# Patient Record
Sex: FEMALE | Race: ASIAN | NOT HISPANIC OR LATINO | URBAN - METROPOLITAN AREA
[De-identification: names, ages, dates, MRNs, and addresses within clinical notes are randomized per-mention and may not be internally consistent; named-entity substitution may affect disease eponyms.]

---

## 2017-06-11 ENCOUNTER — EMERGENCY (EMERGENCY)
Facility: HOSPITAL | Age: 22
LOS: 1 days | Discharge: HOME | End: 2017-06-11
Attending: EMERGENCY MEDICINE | Admitting: EMERGENCY MEDICINE
Payer: MEDICAID

## 2017-06-11 VITALS
HEART RATE: 78 BPM | DIASTOLIC BLOOD PRESSURE: 78 MMHG | RESPIRATION RATE: 24 BRPM | OXYGEN SATURATION: 100 % | TEMPERATURE: 98 F | SYSTOLIC BLOOD PRESSURE: 121 MMHG

## 2017-06-11 PROCEDURE — 99283 EMERGENCY DEPT VISIT LOW MDM: CPT | Mod: 25

## 2017-06-11 PROCEDURE — 93010 ELECTROCARDIOGRAM REPORT: CPT

## 2017-06-11 NOTE — ED ADULT TRIAGE NOTE - CHIEF COMPLAINT QUOTE
Pt. c/o dizziness while lying down x 3-4 days; states she is unable to fall asleep because she becomes "very dizzy" like the "room is spinning". Admits to an episode of nausea, vomiting and abdominal pain associated with dizziness 2 days ago, also c/o feeling "SOB" when dizziness starts. Denies chest pain, palpitations, fever, chills, weakness. Pt. c/o dizziness while lying down x 3-4 days; states she is unable to fall asleep because she becomes "very dizzy" like the "room is spinning". Admits to an episode of nausea, vomiting and abdominal pain associated with dizziness 2 days ago, also c/o "feeling SOB". Denies chest pain, palpitations, fever, chills, weakness.  Respirations even, unlabored, but mildly increased.

## 2017-06-12 VITALS
OXYGEN SATURATION: 100 % | SYSTOLIC BLOOD PRESSURE: 120 MMHG | DIASTOLIC BLOOD PRESSURE: 94 MMHG | RESPIRATION RATE: 16 BRPM | HEART RATE: 79 BPM

## 2017-06-12 LAB
ALBUMIN SERPL ELPH-MCNC: 4.5 G/DL — SIGNIFICANT CHANGE UP (ref 3.3–5)
ALP SERPL-CCNC: 59 U/L — SIGNIFICANT CHANGE UP (ref 40–120)
ALT FLD-CCNC: 8 U/L — SIGNIFICANT CHANGE UP (ref 4–33)
AMORPH CRY # UR COMP ASSIST: SIGNIFICANT CHANGE UP (ref 0–0)
APPEARANCE UR: SIGNIFICANT CHANGE UP
AST SERPL-CCNC: 15 U/L — SIGNIFICANT CHANGE UP (ref 4–32)
BASOPHILS # BLD AUTO: 0.01 K/UL — SIGNIFICANT CHANGE UP (ref 0–0.2)
BASOPHILS NFR BLD AUTO: 0.1 % — SIGNIFICANT CHANGE UP (ref 0–2)
BILIRUB SERPL-MCNC: 0.5 MG/DL — SIGNIFICANT CHANGE UP (ref 0.2–1.2)
BILIRUB UR-MCNC: NEGATIVE — SIGNIFICANT CHANGE UP
BLOOD UR QL VISUAL: NEGATIVE — SIGNIFICANT CHANGE UP
BUN SERPL-MCNC: 13 MG/DL — SIGNIFICANT CHANGE UP (ref 7–23)
CALCIUM SERPL-MCNC: 9.5 MG/DL — SIGNIFICANT CHANGE UP (ref 8.4–10.5)
CHLORIDE SERPL-SCNC: 102 MMOL/L — SIGNIFICANT CHANGE UP (ref 98–107)
CO2 SERPL-SCNC: 24 MMOL/L — SIGNIFICANT CHANGE UP (ref 22–31)
COLOR SPEC: YELLOW — SIGNIFICANT CHANGE UP
CREAT SERPL-MCNC: 0.53 MG/DL — SIGNIFICANT CHANGE UP (ref 0.5–1.3)
EOSINOPHIL # BLD AUTO: 0.06 K/UL — SIGNIFICANT CHANGE UP (ref 0–0.5)
EOSINOPHIL NFR BLD AUTO: 0.7 % — SIGNIFICANT CHANGE UP (ref 0–6)
GLUCOSE SERPL-MCNC: 104 MG/DL — HIGH (ref 70–99)
GLUCOSE UR-MCNC: NEGATIVE — SIGNIFICANT CHANGE UP
HCT VFR BLD CALC: 39.4 % — SIGNIFICANT CHANGE UP (ref 34.5–45)
HGB BLD-MCNC: 12.9 G/DL — SIGNIFICANT CHANGE UP (ref 11.5–15.5)
IMM GRANULOCYTES NFR BLD AUTO: 0.1 % — SIGNIFICANT CHANGE UP (ref 0–1.5)
KETONES UR-MCNC: NEGATIVE — SIGNIFICANT CHANGE UP
LEUKOCYTE ESTERASE UR-ACNC: NEGATIVE — SIGNIFICANT CHANGE UP
LYMPHOCYTES # BLD AUTO: 1.66 K/UL — SIGNIFICANT CHANGE UP (ref 1–3.3)
LYMPHOCYTES # BLD AUTO: 20.7 % — SIGNIFICANT CHANGE UP (ref 13–44)
MCHC RBC-ENTMCNC: 31 PG — SIGNIFICANT CHANGE UP (ref 27–34)
MCHC RBC-ENTMCNC: 32.7 % — SIGNIFICANT CHANGE UP (ref 32–36)
MCV RBC AUTO: 94.7 FL — SIGNIFICANT CHANGE UP (ref 80–100)
MONOCYTES # BLD AUTO: 0.32 K/UL — SIGNIFICANT CHANGE UP (ref 0–0.9)
MONOCYTES NFR BLD AUTO: 4 % — SIGNIFICANT CHANGE UP (ref 2–14)
MUCOUS THREADS # UR AUTO: SIGNIFICANT CHANGE UP
NEUTROPHILS # BLD AUTO: 5.95 K/UL — SIGNIFICANT CHANGE UP (ref 1.8–7.4)
NEUTROPHILS NFR BLD AUTO: 74.4 % — SIGNIFICANT CHANGE UP (ref 43–77)
NITRITE UR-MCNC: NEGATIVE — SIGNIFICANT CHANGE UP
PH UR: 7 — SIGNIFICANT CHANGE UP (ref 4.6–8)
PLATELET # BLD AUTO: 303 K/UL — SIGNIFICANT CHANGE UP (ref 150–400)
PMV BLD: 9.8 FL — SIGNIFICANT CHANGE UP (ref 7–13)
POTASSIUM SERPL-MCNC: 4.2 MMOL/L — SIGNIFICANT CHANGE UP (ref 3.5–5.3)
POTASSIUM SERPL-SCNC: 4.2 MMOL/L — SIGNIFICANT CHANGE UP (ref 3.5–5.3)
PROT SERPL-MCNC: 7.6 G/DL — SIGNIFICANT CHANGE UP (ref 6–8.3)
PROT UR-MCNC: 20 — SIGNIFICANT CHANGE UP
RBC # BLD: 4.16 M/UL — SIGNIFICANT CHANGE UP (ref 3.8–5.2)
RBC # FLD: 12.3 % — SIGNIFICANT CHANGE UP (ref 10.3–14.5)
RBC CASTS # UR COMP ASSIST: SIGNIFICANT CHANGE UP (ref 0–?)
SODIUM SERPL-SCNC: 141 MMOL/L — SIGNIFICANT CHANGE UP (ref 135–145)
SP GR SPEC: 1.02 — SIGNIFICANT CHANGE UP (ref 1–1.03)
SQUAMOUS # UR AUTO: SIGNIFICANT CHANGE UP
UROBILINOGEN FLD QL: NORMAL E.U. — SIGNIFICANT CHANGE UP (ref 0.1–0.2)
WBC # BLD: 8.01 K/UL — SIGNIFICANT CHANGE UP (ref 3.8–10.5)
WBC # FLD AUTO: 8.01 K/UL — SIGNIFICANT CHANGE UP (ref 3.8–10.5)
WBC UR QL: SIGNIFICANT CHANGE UP (ref 0–?)

## 2017-06-12 RX ORDER — FAMOTIDINE 10 MG/ML
1 INJECTION INTRAVENOUS
Qty: 28 | Refills: 0 | OUTPATIENT
Start: 2017-06-12 | End: 2017-06-26

## 2017-06-12 RX ORDER — SODIUM CHLORIDE 9 MG/ML
1000 INJECTION INTRAMUSCULAR; INTRAVENOUS; SUBCUTANEOUS ONCE
Qty: 0 | Refills: 0 | Status: COMPLETED | OUTPATIENT
Start: 2017-06-12 | End: 2017-06-12

## 2017-06-12 RX ORDER — METOCLOPRAMIDE HCL 10 MG
10 TABLET ORAL ONCE
Qty: 0 | Refills: 0 | Status: COMPLETED | OUTPATIENT
Start: 2017-06-12 | End: 2017-06-12

## 2017-06-12 RX ORDER — FAMOTIDINE 10 MG/ML
20 INJECTION INTRAVENOUS ONCE
Qty: 0 | Refills: 0 | Status: COMPLETED | OUTPATIENT
Start: 2017-06-12 | End: 2017-06-12

## 2017-06-12 RX ORDER — ONDANSETRON 8 MG/1
4 TABLET, FILM COATED ORAL ONCE
Qty: 0 | Refills: 0 | Status: COMPLETED | OUTPATIENT
Start: 2017-06-12 | End: 2017-06-12

## 2017-06-12 RX ORDER — METOCLOPRAMIDE HCL 10 MG
1 TABLET ORAL
Qty: 9 | Refills: 0 | OUTPATIENT
Start: 2017-06-12 | End: 2017-06-15

## 2017-06-12 RX ADMIN — FAMOTIDINE 20 MILLIGRAM(S): 10 INJECTION INTRAVENOUS at 01:19

## 2017-06-12 RX ADMIN — ONDANSETRON 4 MILLIGRAM(S): 8 TABLET, FILM COATED ORAL at 01:19

## 2017-06-12 RX ADMIN — SODIUM CHLORIDE 1000 MILLILITER(S): 9 INJECTION INTRAMUSCULAR; INTRAVENOUS; SUBCUTANEOUS at 00:51

## 2017-06-12 RX ADMIN — Medication 10 MILLIGRAM(S): at 01:19

## 2017-06-12 NOTE — ED PROVIDER NOTE - PROGRESS NOTE DETAILS
Edwin OBRIEN- pt feels better , discharged hoem with reglan, pepcid prescription, advised to stay hydrated

## 2017-06-12 NOTE — ED PROVIDER NOTE - OBJECTIVE STATEMENT
21 y/o F with h/o MS resolved after steroids once p/w dizziness for 3 days with loss of appetite, nausea and headache. Pt states that she ate all the meals and her symptoms are very different than MS. Pt had LMP ON 6/2/17. No recent travel or sick contact, no dysuria, hematuria, cough, abd pain

## 2017-06-12 NOTE — ED ADULT NURSE NOTE - OBJECTIVE STATEMENT
Pt received in #6, aaox3 with c/o intermittent dizziness described as room spinning x4 days. States that the symptoms occur primarily while laying down but also occurs while ambulating or doing other activities. Pt had earlier episode of nausea, vomiting and sob but denies currently. Hx of MS, dx'ed in 2012 and has not had any symptoms since then. Not regularly followed by neurology. Denies recent travels, sedentary lifestyle, oral contraceptives, fever or chills. Was in a normal state of health prior to onset of symptoms

## 2017-06-12 NOTE — ED ADULT NURSE NOTE - CHIEF COMPLAINT QUOTE
Pt. c/o dizziness while lying down x 3-4 days; states she is unable to fall asleep because she becomes "very dizzy" like the "room is spinning". Admits to an episode of nausea, vomiting and abdominal pain associated with dizziness 2 days ago, also c/o "feeling SOB". Denies chest pain, palpitations, fever, chills, weakness.  Respirations even, unlabored, but mildly increased.

## 2017-06-12 NOTE — ED PROVIDER NOTE - CRANIAL NERVE AND PUPILLARY EXAM
peripheral vision intact/corneal reflex intact/central vision intact/cough reflex intact/cranial nerves 2-12 intact/extra-ocular movements intact/gag reflex intact/tongue is midline

## 2017-06-13 LAB
BACTERIA UR CULT: SIGNIFICANT CHANGE UP
SPECIMEN SOURCE: SIGNIFICANT CHANGE UP

## 2018-08-28 ENCOUNTER — EMERGENCY (EMERGENCY)
Facility: HOSPITAL | Age: 23
LOS: 1 days | Discharge: HOME | End: 2018-08-28
Attending: EMERGENCY MEDICINE | Admitting: EMERGENCY MEDICINE
Payer: COMMERCIAL

## 2018-08-28 VITALS
RESPIRATION RATE: 18 BRPM | DIASTOLIC BLOOD PRESSURE: 89 MMHG | HEART RATE: 89 BPM | TEMPERATURE: 98 F | OXYGEN SATURATION: 99 % | SYSTOLIC BLOOD PRESSURE: 118 MMHG

## 2018-08-28 PROCEDURE — 99283 EMERGENCY DEPT VISIT LOW MDM: CPT

## 2018-08-28 RX ORDER — EPINEPHRINE 0.3 MG/.3ML
0.3 INJECTION INTRAMUSCULAR; SUBCUTANEOUS ONCE
Qty: 0 | Refills: 0 | Status: DISCONTINUED | OUTPATIENT
Start: 2018-08-28 | End: 2018-08-28

## 2018-08-28 RX ORDER — EPINEPHRINE 0.3 MG/.3ML
0.3 INJECTION INTRAMUSCULAR; SUBCUTANEOUS ONCE
Qty: 0 | Refills: 0 | Status: COMPLETED | OUTPATIENT
Start: 2018-08-28 | End: 2018-08-28

## 2018-08-28 RX ADMIN — EPINEPHRINE 0.3 MILLIGRAM(S): 0.3 INJECTION INTRAMUSCULAR; SUBCUTANEOUS at 10:54

## 2018-08-28 RX ADMIN — Medication 40 MILLIGRAM(S): at 10:54

## 2018-08-28 NOTE — ED PROVIDER NOTE - MEDICAL DECISION MAKING DETAILS
23F states allergic rxn since Fri., unknown exposure, pt. denies culprit foods, has skin c/o without GI or airway or constitutional sx. Tx H1/H2/steroid, epipen for home, will dc with FU.

## 2018-08-28 NOTE — ED PROVIDER NOTE - EYE, LEFT
clear/TENDERNESS/SWELLING/pupils equal, round, and reactive to light/erythema to lids/ST of orbit, normal conjunctiva

## 2018-08-28 NOTE — ED ADULT NURSE NOTE - OBJECTIVE STATEMENT
Patient received AA&Ox3 c/o swelling to left eye and bumps to back of neck that started Monday morning r/t allergic reaction. VSS on RA. Patient denies chest pain, N/V, SOB, fever, chills, dyspnea, dizziness at this time. Patient ambulates independently, skin intact. NAD noted - will continue to monitor.

## 2018-08-28 NOTE — ED PROVIDER NOTE - HEME-LYMPH CERVICAL ADENOPATHY
POSTERIOR/L post cerv LN, flat, NT (pt. states chronic, likely reactive node unrelated to acute c/o)

## 2018-08-28 NOTE — ED ADULT TRIAGE NOTE - CHIEF COMPLAINT QUOTE
Patient has c/o allergic reaction that started last night. Pt has left eye swelling, bumps to her neck, back and mid abdomen.

## 2018-08-28 NOTE — ED ADULT NURSE NOTE - NSIMPLEMENTINTERV_GEN_ALL_ED
Implemented All Universal Safety Interventions:  [place] to call system. Call bell, personal items and telephone within reach. Instruct patient to call for assistance. Room bathroom lighting operational. Non-slip footwear when patient is off stretcher. Physically safe environment: no spills, clutter or unnecessary equipment. Stretcher in lowest position, wheels locked, appropriate side rails in place.

## 2018-08-28 NOTE — ED PROVIDER NOTE - OBJECTIVE STATEMENT
24 yo F complaining of facial swelling, itching, and pain that began on Sunday. She states that she was out in [place] on Saturday, and woke up on Sunday with right sided facial swelling. The patient noted ongoing swelling, pain and itchiness of her ears, face, forehead and flank, which has been episodic and migratory. She continued to experience symptoms yesterday, and took 1 benadryl in the evening without relief. She woke up with improved R eye sx but new L sided facial swelling and pain. Patient reports no history of allergies, denies previous allergic reaction and takes no medications. Patient denies recent travel, but notes consumption of sweet potato chips and donuts which were not in her normal diet. Patient denies changes in lotions, detergents, and other environmental exposures. Patient denies fevers, chills, shortness of breath, respiratory difficulty, vision changes, nausea, vomiting and diarrhea. LMP 3 weeks ago.

## 2018-09-05 ENCOUNTER — EMERGENCY (EMERGENCY)
Facility: HOSPITAL | Age: 23
LOS: 1 days | Discharge: HOME | End: 2018-09-05
Attending: EMERGENCY MEDICINE
Payer: COMMERCIAL

## 2018-09-05 VITALS
HEIGHT: 64 IN | DIASTOLIC BLOOD PRESSURE: 95 MMHG | WEIGHT: 138.89 LBS | TEMPERATURE: 98 F | HEART RATE: 98 BPM | OXYGEN SATURATION: 98 % | SYSTOLIC BLOOD PRESSURE: 165 MMHG | RESPIRATION RATE: 20 BRPM

## 2018-09-05 VITALS
SYSTOLIC BLOOD PRESSURE: 132 MMHG | TEMPERATURE: 98 F | RESPIRATION RATE: 17 BRPM | DIASTOLIC BLOOD PRESSURE: 82 MMHG | OXYGEN SATURATION: 98 % | HEART RATE: 85 BPM

## 2018-09-05 PROCEDURE — 99283 EMERGENCY DEPT VISIT LOW MDM: CPT | Mod: 25

## 2018-09-05 PROCEDURE — 96372 THER/PROPH/DIAG INJ SC/IM: CPT

## 2018-09-05 PROCEDURE — 99284 EMERGENCY DEPT VISIT MOD MDM: CPT

## 2018-09-05 RX ORDER — FAMOTIDINE 10 MG/ML
20 INJECTION INTRAVENOUS ONCE
Qty: 0 | Refills: 0 | Status: COMPLETED | OUTPATIENT
Start: 2018-09-05 | End: 2018-09-05

## 2018-09-05 RX ORDER — DIPHENHYDRAMINE HCL 50 MG
50 CAPSULE ORAL ONCE
Qty: 0 | Refills: 0 | Status: COMPLETED | OUTPATIENT
Start: 2018-09-05 | End: 2018-09-05

## 2018-09-05 RX ADMIN — Medication 50 MILLIGRAM(S): at 21:23

## 2018-09-05 RX ADMIN — Medication 60 MILLIGRAM(S): at 21:23

## 2018-09-05 RX ADMIN — FAMOTIDINE 20 MILLIGRAM(S): 10 INJECTION INTRAVENOUS at 21:22

## 2018-09-05 NOTE — ED PROVIDER NOTE - SKIN, MLM
urticarial rash consistent with hives to scalp, mild on face, neck, trunk, and lower extremities. blanchable. no rash to palms or soles. no petechia. no mucus membrane involvement

## 2018-09-05 NOTE — ED ADULT NURSE NOTE - NSIMPLEMENTINTERV_GEN_ALL_ED
Implemented All Universal Safety Interventions:  [place] to call system. Call bell, personal items and telephone within reach. Instruct patient to call for assistance. Room bathroom lighting operational. Non-slip footwear when patient is off stretcher. Physically safe environment: no spills, clutter or unnecessary equipment. Stretcher in lowest position, wheels locked, appropriate side rails in place.

## 2018-09-05 NOTE — ED ADULT TRIAGE NOTE - CHIEF COMPLAINT QUOTE
[facility]  states prednisone has made it worse.

## 2018-09-05 NOTE — ED PROVIDER NOTE - PROGRESS NOTE DETAILS
Reevaluated patient at bedside.  Patient feeling much improved after IM benadryl, oral pepcid, and oral prednisone. rash is more faint. itching much improved.     An opportunity to ask questions was given.  Discussed the importance of prompt, close medical follow-up. referral to allergist provided. will call tomorrow morning to arrange follow up  Patient will return with any changes, concerns or persistent / worsening symptoms.  Understanding of all instructions verbalized.  recommend to continue benadryl over the counter on a consistent basis, pepcid twice a day, prednisone taper, and to keep close monitoring of any potential exposures or allergins. offered topical creams which patient declines. may also benefit from once a day oral Claritin or zyrtec.

## 2018-09-05 NOTE — ED ADULT NURSE NOTE - CHIEF COMPLAINT QUOTE
[facility]  states prednisone has made it worse.

## 2018-09-05 NOTE — ED ADULT NURSE NOTE - OBJECTIVE STATEMENT
Pt. received alert and oriented x4 with chief complaint of hives all over body after eating [employer] 1 week ago w/ partial relief from benadryl since.

## 2018-09-05 NOTE — ED PROVIDER NOTE - ENMT, MLM
Airway patent, Mouth with normal mucosa. Throat has no vesicles, no oropharyngeal exudates and uvula is midline. no lip or tongue swelling. mild swelling to right side of face underneath right eye

## 2018-09-05 NOTE — ED PROVIDER NOTE - OBJECTIVE STATEMENT
presents with itchy rash that started on August 25th. started while eating at [employer] which is new for her. noticed an itchy patch to left side of trunk that started while at restaurant but ignored it. started to have itchy rash to face and scalp along with swelling to her eyes. took a dose of benadryl which did not help much. was seen at ED at [place] on Auguest 28th. was given IV steroids. seemed to help swelling to eyes and rash to face, but rash did not go away. was given 5 days of prednisone but rash continues. will change locations. takes benadryl at night which helps her sleep and itching, but does not believe it has been helping much with the rash. tried home remedy of black pepper and raw sugar cane while drinking hot milk which seemed to help slightly. denies any other known ingestions or exposures. presents with itchy rash that started on August 25th. started while eating at [employer] which is new for her. noticed an itchy patch to left side of trunk that started while at restaurant but ignored it. started to have itchy rash to face and scalp along with swelling to her eyes. took a dose of benadryl which did not help much. was seen at ED at [place] on August 28th. was given IV steroids. seemed to help swelling to eyes and rash to face, but rash did not go away. was given 5 days of prednisone but rash continues. will change locations. takes benadryl at night which helps her sleep and itching, but does not believe it has been helping much with the rash. was only taking the benadryl at night and not during the day.  tried home remedy of black pepper and raw sugar cane while drinking hot milk which seemed to help mildly last night. denies any other known ingestions or exposures. has not taken any benadryl or steroids in the past 2 days  PCP Jacques Cano

## 2018-09-05 NOTE — ED PROVIDER NOTE - ATTENDING CONTRIBUTION TO CARE
Pt. with mild swelling under right orbit. +Swelling/erythema to right zygoma. Lungs are clear. NO respiratory distress. Pt. with persistent pruritis. Pt. was not taking her Benadryl consistently. Pt. advised to take benadryl more frequently and PO steroids will be continued. H2 blocker also will be added. I have discussed the plan with the ACP.

## 2018-09-05 NOTE — ED PROVIDER NOTE - MEDICAL DECISION MAKING DETAILS
urticial rash/hives since aug 25th. was put on prednisone and benadryl on august 28th. has not taken any benadryl in past 2 days and was only taking it at night. states it was helping with the itching. concerned because rash has continued. suspect rash due to hives. unknown allergins/exposures. will refer to allergist. will also treat with oral prednisone, oral pepcid, and IM benadryl. no diff breathing or facial swelling. no evidence of anaphylactic reaction.

## 2018-09-06 PROBLEM — H46.9 UNSPECIFIED OPTIC NEURITIS: Chronic | Status: ACTIVE | Noted: 2018-08-28

## 2023-01-03 ENCOUNTER — EMERGENCY (EMERGENCY)
Facility: HOSPITAL | Age: 28
LOS: 1 days | Discharge: HOME | End: 2023-01-03
Attending: EMERGENCY MEDICINE | Admitting: EMERGENCY MEDICINE
Payer: COMMERCIAL

## 2023-01-03 ENCOUNTER — APPOINTMENT (OUTPATIENT)
Dept: DERMATOLOGY | Facility: CLINIC | Age: 28
End: 2023-01-03
Payer: COMMERCIAL

## 2023-01-03 VITALS
TEMPERATURE: 98 F | DIASTOLIC BLOOD PRESSURE: 68 MMHG | OXYGEN SATURATION: 100 % | RESPIRATION RATE: 16 BRPM | HEART RATE: 68 BPM | SYSTOLIC BLOOD PRESSURE: 135 MMHG

## 2023-01-03 VITALS
SYSTOLIC BLOOD PRESSURE: 132 MMHG | HEART RATE: 85 BPM | OXYGEN SATURATION: 98 % | RESPIRATION RATE: 18 BRPM | DIASTOLIC BLOOD PRESSURE: 62 MMHG | TEMPERATURE: 98 F

## 2023-01-03 VITALS — HEIGHT: 64 IN | BODY MASS INDEX: 25.61 KG/M2 | WEIGHT: 150 LBS

## 2023-01-03 DIAGNOSIS — L50.3 DERMATOGRAPHIC URTICARIA: ICD-10-CM

## 2023-01-03 DIAGNOSIS — L30.9 DERMATITIS, UNSPECIFIED: ICD-10-CM

## 2023-01-03 DIAGNOSIS — L85.3 XEROSIS CUTIS: ICD-10-CM

## 2023-01-03 PROCEDURE — 99284 EMERGENCY DEPT VISIT MOD MDM: CPT

## 2023-01-03 PROCEDURE — 99204 OFFICE O/P NEW MOD 45 MIN: CPT

## 2023-01-03 RX ORDER — DIPHENHYDRAMINE HCL 50 MG
1 CAPSULE ORAL
Qty: 30 | Refills: 0
Start: 2023-01-03

## 2023-01-03 RX ORDER — TRIAMCINOLONE ACETONIDE 1 MG/G
0.1 OINTMENT TOPICAL
Qty: 1 | Refills: 2 | Status: ACTIVE | COMMUNITY
Start: 2023-01-03 | End: 1900-01-01

## 2023-01-03 RX ADMIN — Medication 50 MILLIGRAM(S): at 09:22

## 2023-01-03 NOTE — ED PROVIDER NOTE - SKIN LOCATION #1
Small papular rash of the bilateral arms and legs, Small papular rash of the bilateral arms and legs, erythematous scaly rash of the bilateral elbow/knee creases Papular rash of the bilateral arms and legs, erythematous scaly rash of the bilateral flexor surfaces of the elbow/knee creases

## 2023-01-03 NOTE — ED PROVIDER NOTE - ATTENDING APP SHARED VISIT CONTRIBUTION OF CARE
Pt was seen and evaluated by me. Pt is a 26 y/o female with PMHx Multiple Sclerosis who presented to the ED for rash x 1 week. Pt states having a previous rash to legs and was taking Zyrtec that improved the rash but that she stopped it has it made her sleepy. Pt notes over the past week having a rash to her legs again she assumed was from her razor with itchiness. Pt notes then having a rash with itchiness to flexor surfaces of elbows and knees. Pt denies any fever, chills, nausea, vomiting, SOB, chest pain, or abd pain. Pt denies any facial involvement.   VITALS: Vitals have been reviewed.  GEN APPEARANCE: Alert and cooperative, non-toxic appearing and in NAD  HEAD: Atraumatic, normocephalic.   EYES: PERRL, EOMI.   EARS: Gross hearing intact.   NOSE: No nasal discharge.   THROAT: MMM. Oral cavity and pharynx normal. Uvula midline. No swelling. No exudate.    NECK: Supple, no lymphadenopathy  CV: RRR, S1S2, no c/r/m/g. No cyanosis or pallor. Extremities warm, well perfused. Cap refill <2 seconds. No bruits.   LUNGS: CTAB. No wheezing. No rales. No rhonchi. No diminished breath sounds.   ABDOMEN: Soft, NTND. No guarding or rebound.   MSK/EXT: Spine appears normal, no spine point tenderness.   NEURO: Alert, follows commands. Speech normal. Sensation and motor normal x4 extremities.   SKIN: Erythematous, scaly rash, blanching to flexor surfaces of knees and elbows. Noted on abd, blanching. No oral involvement.   26 y/o female with PMHx Multiple Sclerosis who presented to the ED for rash x 1 week.   Concern for eczema, urticaria, dermatitis  Steroids, Benadryl, Derm f/u

## 2023-01-03 NOTE — ED PROVIDER NOTE - ENMT, MLM
Airway patent, Nasal mucosa clear. Mouth with normal mucosa. Throat has no vesicles, no oropharyngeal exudates and uvula is midline. Airway patent, Nasal mucosa clear. Mouth with normal mucosa. Throat has no vesicles, no oropharyngeal exudates/erythema and uvula is midline.

## 2023-01-03 NOTE — ED PROVIDER NOTE - NSFOLLOWUPINSTRUCTIONS_ED_ALL_ED_FT
Advance activity as tolerated.  Continue all previously prescribed medications as directed unless otherwise instructed.  Follow up with your primary care physician in 48-72 hours- bring copies of your results.  Return to the ER for worsening or persistent symptoms, and/or ANY NEW OR CONCERNING SYMPTOMS worsening rash, shortness of breath, difficulty breathing, swelling of the throat/difficulty swallowing. If you have issues obtaining follow up, please call: [phone] to obtain a doctor or specialist who takes your insurance in your area.  You may call [phone] to make an appointment with the internal medicine clinic. Benadryl 25 mg, 1 capsule every 6 hours as needed for itching  Prednisone 20 mg, 2 tablets once a day  Please follow-up with Dermatologist appointment as discussed.     Advance activity as tolerated.  Continue all previously prescribed medications as directed unless otherwise instructed.  Follow up with your primary care physician in 48-72 hours- bring copies of your results.  Return to the ER for worsening or persistent symptoms, and/or ANY NEW OR CONCERNING SYMPTOMS worsening rash, shortness of breath, difficulty breathing, swelling of the throat/difficulty swallowing. If you have issues obtaining follow up, please call: [phone] to obtain a doctor or specialist who takes your insurance in your area.  You may call [phone] to make an appointment with the internal medicine clinic.      Rash, Adult    Heat rash on a person's hand.   A rash is a change in the color of your skin. A rash can also change the way your skin feels. There are many different conditions and factors that can cause a rash.      Follow these instructions at home:    The goal of treatment is to stop the itching and keep the rash from spreading. Watch for any changes in your symptoms. Let your doctor know about them. Follow these instructions to help with your condition:      Medicine   A tube of ointment.   Take or apply over-the-counter and prescription medicines only as told by your doctor. These may include medicines:  •To treat red or swollen skin (corticosteroid creams).      •To treat itching.      •To treat an allergy (oral antihistamines).      •To treat very bad symptoms (oral corticosteroids).      Skin care     •Put cool cloths (compresses) on the affected areas.      • Do not scratch or rub your skin.      •Avoid covering the rash. Make sure that the rash is exposed to air as much as possible.      Managing itching and discomfort     •Avoid hot showers or baths. These can make itching worse. A cold shower may help.    •Try taking a bath with:  •Epsom salts. You can get these at your local pharmacy or grocery store. Follow the instructions on the package.      •Baking soda. Pour a small amount into the bath as told by your doctor.      •Colloidal oatmeal. You can get this at your local pharmacy or grocery store. Follow the instructions on the package.        •Try putting baking soda paste onto your skin. Stir water into baking soda until it gets like a paste.      •Try putting on a lotion that relieves itchiness (calamine lotion).      •Keep cool and out of the sun. Sweating and being hot can make itching worse.        General instructions   A person writing in a journal.    •Rest as needed.      •Drink enough fluid to keep your pee (urine) pale yellow.      •Wear loose-fitting clothing.      •Avoid scented soaps, detergents, and perfumes. Use gentle soaps, detergents, perfumes, and other cosmetic products.    •Avoid anything that causes your rash. Keep a journal to help track what causes your rash. Write down:  •What you eat.      •What cosmetic products you use.      •What you drink.      •What you wear. This includes jewelry.        •Keep all follow-up visits as told by your doctor. This is important.        Contact a doctor if:    •You sweat at night.      •You lose weight.      •You pee (urinate) more than normal.      •You pee less than normal, or you notice that your pee is a darker color than normal.      •You feel weak.      •You throw up (vomit).      •Your skin or the whites of your eyes look yellow (jaundice).    •Your skin:  •Tingles.      •Is numb.      •Your rash:  •Does not go away after a few days.      •Gets worse.      •You are:  •More thirsty than normal.      •More tired than normal.      •You have:  •New symptoms.      •Pain in your belly (abdomen).      •A fever.      •Watery poop (diarrhea).          Get help right away if:    •You have a fever and your symptoms suddenly get worse.      •You start to feel mixed up (confused).      •You have a very bad headache or a stiff neck.      •You have very bad joint pains or stiffness.      •You have jerky movements that you cannot control (seizure).      •Your rash covers all or most of your body. The rash may or may not be painful.    •You have blisters that:  •Are on top of the rash.      •Grow larger.      •Grow together.      •Are painful.      •Are inside your nose or mouth.      •You have a rash that:  •Looks like purple pinprick-sized spots all over your body.      •Has a "bull's eye" or looks like a target.      •Is red and painful, causes your skin to peel, and is not from being in the sun too long.          Summary    •A rash is a change in the color of your skin. A rash can also change the way your skin feels.      •The goal of treatment is to stop the itching and keep the rash from spreading.      •Take or apply over-the-counter and prescription medicines only as told by your doctor.      •Contact a doctor if you have new symptoms or symptoms that get worse.      •Keep all follow-up visits as told by your doctor. This is important.      This information is not intended to replace advice given to you by your health care provider. Make sure you discuss any questions you have with your health care provider.

## 2023-01-03 NOTE — ED PROVIDER NOTE - PATIENT PORTAL LINK FT
You can access the FollowMyHealth Patient Portal offered by [facility] by registering at the following website: [email or web address]. By joining [facility]’s FollowMyHealth portal, you will also be able to view your health information using other applications (apps) compatible with our system.

## 2023-01-03 NOTE — ED ADULT NURSE NOTE - OBJECTIVE STATEMENT
received pt in intake room 9, 27 yr/o female A+OX4, ambulatory at baseline. PMH of MS. pt prestend to the ED C/O B/L upper extremitiy rash for 1 week. states received pt in intake room 9, 27 yr/o female A+OX4, ambulatory at baseline. PMH of MSJose Eduardo pt presented to the ED C/O B/L upper extremity's rash for 1 week. states rash started on legs 1 yr ago where she though it was just [place], was seen by PCP and had allergy study performs. small circular bumps noted to B/L upper extremity, lower extremities, and abdomen. states rash is itchy and burn when she showers. meds given as ordered. VSS, denies chest pain and SOB, RR even and unlabored. will continue to monitor.

## 2023-01-03 NOTE — ED PROVIDER NOTE - CLINICAL SUMMARY MEDICAL DECISION MAKING FREE TEXT BOX
27F with PMH Multiple Sclerosis who presents to ED with pruritic rash of the bilateral arms/legs, chest, back x 1 week. Patient currently afebrile, hemodynamically stable, spO2 98%. Based on history and physical, differentials include but are not limited to allergic reaction, contact dermatitis, folliculitis, eczema. Plan to administer Prednisone for symptoms and reassess. Will most likely discharge patient with Dermatology follow-up with discharge lounge. 27F with PMH Multiple Sclerosis who presents to ED with pruritic rash of the bilateral arms/legs, chest/back/abdomen x 1 week. Patient currently afebrile, hemodynamically stable, spO2 98%. Based on history and physical, differentials include but are not limited to allergic reaction, contact dermatitis, folliculitis, eczema/ atopic dermatitis. Plan to administer Prednisone for symptoms and reassess. Will most likely discharge patient with Dermatology follow-up with discharge lounge.

## 2023-01-03 NOTE — ED PROVIDER NOTE - OBJECTIVE STATEMENT
27F with PMH Multiple Sclerosis who presents to ED with rash x 1 week. Reporting intermittent, pruritic rash of the bilateral arms/legs, torso, back, no aggravating or alleviating factors, pt went to PMD and was given hydrocortisone cream but with no relief, pt also took Zyrtec but stopped taking it due to drowsiness, no associated shortness of breath, difficulty breathing, or difficulty swallowing. Reports history of similar symptoms in the past, never followed up with allergists or dermatologists for the symptoms, no known allergies to medications or foods. Denies eating new foods, new lotions/creams/soaps/detergents etc. No known ill contacts with similar symptoms. Denies fever, chills, chest pain, shortness of breath, abdominal pain, N/V/D, urinary symptoms such as dysuria, urinary frequency/urgency, extremity weakness/numbness/tingling, lightheadedness, dizziness, or headaches. 27F with PMH Multiple Sclerosis who presents to ED with rash x 1 week. Reporting intermittent, pruritic rash of the bilateral arms/legs, chest, back, no aggravating or alleviating factors, pt went to PMD and was given hydrocortisone cream but with no relief, pt also took Zyrtec but stopped taking it due to drowsiness, no associated shortness of breath, difficulty breathing, or difficulty swallowing. Reports history of similar symptoms in the past, never followed up with allergists or dermatologists for the symptoms, no known allergies to medications or foods. Denies eating new foods, new lotions/creams/soaps/detergents etc. No known ill contacts with similar symptoms. Denies fever, chills, chest pain, shortness of breath, abdominal pain, N/V/D, urinary symptoms such as dysuria, urinary frequency/urgency, extremity weakness/numbness/tingling, lightheadedness, dizziness, or headaches. 27F with PMH Multiple Sclerosis who presents to ED with rash x 1 week. Reporting intermittent, pruritic rash of the bilateral arms/legs, chest/back/abdomen, no aggravating or alleviating factors, pt went to PMD and was given hydrocortisone cream but with no relief, pt also took Zyrtec but stopped taking it due to drowsiness, no associated shortness of breath, difficulty breathing, or difficulty swallowing. Pt initially thought symptoms were due to shaving her legs. Reports history of similar symptoms in the past, never followed up with allergists or dermatologists for the symptoms, no known allergies to medications or foods. Denies eating new foods, new lotions/creams/soaps/detergents etc. No known ill contacts with similar symptoms. Denies fever, chills, chest pain, shortness of breath, abdominal pain, N/V/D, urinary symptoms such as dysuria, urinary frequency/urgency, extremity weakness/numbness/tingling, lightheadedness, dizziness, or headaches. 27F with PMH Multiple Sclerosis who presents to ED with rash x 1 week. Reporting intermittent, pruritic rash of the bilateral arms/legs, chest/back/abdomen, no aggravating or alleviating factors, pt went to PMD and was given hydrocortisone cream with no relief, pt also took Zyrtec but stopped taking it due to drowsiness, no associated shortness of breath, difficulty breathing, or difficulty swallowing. Pt initially thought symptoms were due to shaving her legs. Reports history of similar symptoms in the past, never followed up with allergists or dermatologists for the symptoms, no known allergies to medications or foods. Denies eating new foods, new lotions/creams/soaps/detergents etc. No known ill contacts with similar symptoms. Denies fever, chills, chest pain, shortness of breath, abdominal pain, N/V/D, urinary symptoms such as dysuria, urinary frequency/urgency, extremity weakness/numbness/tingling, lightheadedness, dizziness, or headaches.

## 2023-01-24 ENCOUNTER — APPOINTMENT (OUTPATIENT)
Dept: DERMATOLOGY | Facility: CLINIC | Age: 28
End: 2023-01-24

## 2024-07-29 NOTE — ED PROVIDER NOTE - CROS ED SKIN ALL NEG
Clinic RN: Please contact pharmacy because  this is the third request for a refill we have received from pharmacy on patient's behalf. This refill request should be going to patients psych provider, not PCP. See refill encounter 7/18/24.    Bonnie Bai, JITENDRAN, RN    
RN called and relayed this to pharmacy.     Amina Stinson, BSN, RN     
negative...